# Patient Record
Sex: MALE | Race: OTHER | NOT HISPANIC OR LATINO
[De-identification: names, ages, dates, MRNs, and addresses within clinical notes are randomized per-mention and may not be internally consistent; named-entity substitution may affect disease eponyms.]

---

## 2019-07-30 PROBLEM — Z00.00 ENCOUNTER FOR PREVENTIVE HEALTH EXAMINATION: Status: ACTIVE | Noted: 2019-07-30

## 2019-08-05 ENCOUNTER — APPOINTMENT (OUTPATIENT)
Dept: OTOLARYNGOLOGY | Facility: CLINIC | Age: 54
End: 2019-08-05
Payer: MEDICARE

## 2019-08-05 VITALS
DIASTOLIC BLOOD PRESSURE: 73 MMHG | BODY MASS INDEX: 19.26 KG/M2 | WEIGHT: 130 LBS | HEIGHT: 69 IN | TEMPERATURE: 98.8 F | OXYGEN SATURATION: 99 % | HEART RATE: 51 BPM | SYSTOLIC BLOOD PRESSURE: 123 MMHG

## 2019-08-05 DIAGNOSIS — F17.200 NICOTINE DEPENDENCE, UNSPECIFIED, UNCOMPLICATED: ICD-10-CM

## 2019-08-05 DIAGNOSIS — Z78.9 OTHER SPECIFIED HEALTH STATUS: ICD-10-CM

## 2019-08-05 PROCEDURE — 92557 COMPREHENSIVE HEARING TEST: CPT

## 2019-08-05 PROCEDURE — 92588 EVOKED AUDITORY TST COMPLETE: CPT

## 2019-08-05 PROCEDURE — 99204 OFFICE O/P NEW MOD 45 MIN: CPT | Mod: 25

## 2019-08-05 PROCEDURE — 92550 TYMPANOMETRY & REFLEX THRESH: CPT

## 2019-08-05 PROCEDURE — 92504 EAR MICROSCOPY EXAMINATION: CPT

## 2019-08-05 NOTE — DATA REVIEWED
[de-identified] : today: pos OAEs and reflexes w/ nl tymps; diff w/ testing/poor reliability & varying responses based (mod-severe SNHL AU) on use of inserts vs. phones, RTC for ASSR recommended

## 2019-08-05 NOTE — ASSESSMENT
[FreeTextEntry1] : Discussed his audio results & recommended RTC for further testing; will consider imaging then. \par Discussed likely myoclonus & prevention w/ possible botox for ongoing sxs.

## 2019-08-05 NOTE — PHYSICAL EXAM
[Binocular Microscopic Exam] : Binocular microscopic exam was performed [Normal] : no rashes [de-identified] : large ITs bilat

## 2019-08-05 NOTE — HISTORY OF PRESENT ILLNESS
[de-identified] : Longstanding progressive bilateral hearing loss causing him to need to watch people's lips in conversation. Also w/ bilat HF nonpulsatile tinnitus. No hx ear surg, drainage, loud noise exp, or FHx hearing loss. \par 2 weeks of a very rapid beating sound (too fast to be a heartbeat) that lasts seconds to minutes. This causes some discomfort that resolves when the beating goes away. Hasn't tried meds.

## 2019-08-12 ENCOUNTER — APPOINTMENT (OUTPATIENT)
Dept: OTOLARYNGOLOGY | Facility: CLINIC | Age: 54
End: 2019-08-12
Payer: MEDICARE

## 2019-08-12 VITALS
OXYGEN SATURATION: 98 % | TEMPERATURE: 98 F | SYSTOLIC BLOOD PRESSURE: 115 MMHG | BODY MASS INDEX: 19.26 KG/M2 | WEIGHT: 130 LBS | HEART RATE: 74 BPM | HEIGHT: 69 IN | DIASTOLIC BLOOD PRESSURE: 73 MMHG

## 2019-08-12 DIAGNOSIS — G25.3 MYOCLONUS: ICD-10-CM

## 2019-08-12 PROCEDURE — 92585: CPT

## 2019-08-12 PROCEDURE — 99214 OFFICE O/P EST MOD 30 MIN: CPT

## 2019-08-12 NOTE — HISTORY OF PRESENT ILLNESS
[de-identified] : Longstanding progressive bilateral hearing loss causing him to need to watch people's lips in conversation; he now states that the R side seems more muffled than the left (for a few years). Also w/ bilat HF nonpulsatile tinnitus. No hx ear surg, drainage, pain, loud noise exp, or FHx hearing loss. No changes; returns for an ASSR d/t difficulty w/ the testing last time. \par The prior very rapid beating sound (too fast to be a heartbeat) that hasn't happened again since last seen.

## 2019-08-12 NOTE — DATA REVIEWED
[de-identified] : 8/19: pos OAEs and reflexes w/ nl tymps; diff w/ testing/poor reliability & varying responses based (mod-severe SNHL AU) on use of inserts vs. phones\par today: ASSR w/ L ess nl hearing; R c/w mod HL. ABR nl AS, abnl AD

## 2019-08-13 ENCOUNTER — FORM ENCOUNTER (OUTPATIENT)
Age: 54
End: 2019-08-13

## 2019-08-14 ENCOUNTER — OUTPATIENT (OUTPATIENT)
Dept: OUTPATIENT SERVICES | Facility: HOSPITAL | Age: 54
LOS: 1 days | End: 2019-08-14

## 2019-08-14 ENCOUNTER — APPOINTMENT (OUTPATIENT)
Dept: MRI IMAGING | Facility: CLINIC | Age: 54
End: 2019-08-14
Payer: MEDICARE

## 2019-08-14 PROCEDURE — 70553 MRI BRAIN STEM W/O & W/DYE: CPT | Mod: 26

## 2019-08-15 ENCOUNTER — APPOINTMENT (OUTPATIENT)
Dept: OTOLARYNGOLOGY | Facility: CLINIC | Age: 54
End: 2019-08-15

## 2019-08-19 ENCOUNTER — APPOINTMENT (OUTPATIENT)
Dept: OTOLARYNGOLOGY | Facility: CLINIC | Age: 54
End: 2019-08-19

## 2019-08-22 ENCOUNTER — APPOINTMENT (OUTPATIENT)
Dept: OTOLARYNGOLOGY | Facility: CLINIC | Age: 54
End: 2019-08-22
Payer: MEDICARE

## 2019-08-22 VITALS
WEIGHT: 130 LBS | TEMPERATURE: 97.9 F | HEART RATE: 54 BPM | OXYGEN SATURATION: 99 % | SYSTOLIC BLOOD PRESSURE: 123 MMHG | DIASTOLIC BLOOD PRESSURE: 67 MMHG | BODY MASS INDEX: 19.26 KG/M2 | HEIGHT: 69 IN

## 2019-08-22 DIAGNOSIS — H90.3 SENSORINEURAL HEARING LOSS, BILATERAL: ICD-10-CM

## 2019-08-22 DIAGNOSIS — H93.13 TINNITUS, BILATERAL: ICD-10-CM

## 2019-08-22 PROCEDURE — 99214 OFFICE O/P EST MOD 30 MIN: CPT

## 2019-08-22 NOTE — HISTORY OF PRESENT ILLNESS
[de-identified] : Longstanding progressive bilateral hearing loss causing him to need to watch people's lips in conversation; he now states that the R side seems more muffled than the left (for a few years). Also w/ bilat HF nonpulsatile tinnitus. No hx ear surg, drainage, pain, loud noise exp, or FHx hearing loss. No changes; returns to review a MRI after an abnl ABR. \par The prior very rapid beating sound (too fast to be a heartbeat) that hasn't happened again since last seen.

## 2019-08-22 NOTE — DATA REVIEWED
[de-identified] : 8/19: pos OAEs and reflexes w/ nl tymps; diff w/ testing/poor reliability & varying responses based (mod-severe SNHL AU) on use of inserts vs. phones\par 8/19: ASSR w/ L ess nl hearing; R c/w mod HL. ABR nl AS, abnl AD [de-identified] : MR brain/IAC: nl

## 2019-08-29 ENCOUNTER — APPOINTMENT (OUTPATIENT)
Dept: PHARMACY | Facility: CLINIC | Age: 54
End: 2019-08-29
Payer: MEDICARE

## 2019-08-29 PROCEDURE — V5010 ASSESSMENT FOR HEARING AID: CPT

## 2019-08-29 PROCEDURE — V5110: CPT

## 2019-08-29 PROCEDURE — V5261F: CUSTOM

## 2019-10-14 ENCOUNTER — APPOINTMENT (OUTPATIENT)
Dept: PHARMACY | Facility: CLINIC | Age: 54
End: 2019-10-14
Payer: MEDICARE

## 2019-10-14 PROCEDURE — V5299A: CUSTOM | Mod: NC

## 2019-10-28 ENCOUNTER — APPOINTMENT (OUTPATIENT)
Dept: PHARMACY | Facility: CLINIC | Age: 54
End: 2019-10-28

## 2020-01-02 ENCOUNTER — APPOINTMENT (OUTPATIENT)
Dept: PHARMACY | Facility: CLINIC | Age: 55
End: 2020-01-02
Payer: MEDICARE

## 2020-01-02 PROCEDURE — V5299A: CUSTOM | Mod: NC

## 2020-01-16 ENCOUNTER — EMERGENCY (EMERGENCY)
Facility: HOSPITAL | Age: 55
LOS: 1 days | Discharge: HOME | End: 2020-01-16
Attending: EMERGENCY MEDICINE | Admitting: EMERGENCY MEDICINE
Payer: MEDICARE

## 2020-01-16 ENCOUNTER — APPOINTMENT (OUTPATIENT)
Dept: PHARMACY | Facility: CLINIC | Age: 55
End: 2020-01-16
Payer: MEDICARE

## 2020-01-16 VITALS
TEMPERATURE: 98 F | OXYGEN SATURATION: 97 % | HEART RATE: 56 BPM | HEIGHT: 69 IN | RESPIRATION RATE: 16 BRPM | DIASTOLIC BLOOD PRESSURE: 81 MMHG | WEIGHT: 128.09 LBS | SYSTOLIC BLOOD PRESSURE: 117 MMHG

## 2020-01-16 PROCEDURE — 99283 EMERGENCY DEPT VISIT LOW MDM: CPT

## 2020-01-16 PROCEDURE — 92626 EVAL AUD FUNCJ 1ST HOUR: CPT

## 2020-01-16 PROCEDURE — V5110: CPT

## 2020-01-16 RX ORDER — ACETAMINOPHEN 500 MG
650 TABLET ORAL ONCE
Refills: 0 | Status: COMPLETED | OUTPATIENT
Start: 2020-01-16 | End: 2020-01-16

## 2020-01-16 RX ORDER — PSEUDOEPHEDRINE HCL 30 MG
30 TABLET ORAL ONCE
Refills: 0 | Status: COMPLETED | OUTPATIENT
Start: 2020-01-16 | End: 2020-01-16

## 2020-01-16 RX ORDER — ONDANSETRON 8 MG/1
4 TABLET, FILM COATED ORAL ONCE
Refills: 0 | Status: COMPLETED | OUTPATIENT
Start: 2020-01-16 | End: 2020-01-16

## 2020-01-16 NOTE — ED PROVIDER NOTE - ENMT, MLM
Airway patent, Bilateral clear nasal discharge. Mouth with normal mucosa. Throat has no vesicles, no tonsillar erythema, no oropharyngeal exudates and uvula is midline.

## 2020-01-16 NOTE — ED PROVIDER NOTE - PATIENT PORTAL LINK FT
You can access the FollowMyHealth Patient Portal offered by [facility] by registering at the following website: [email or web address]. By joining [facility]’s FollowMyHealth portal, you will also be able to view your health information using other applications (apps) compatible with our system.

## 2020-01-16 NOTE — ED PROVIDER NOTE - CLINICAL SUMMARY MEDICAL DECISION MAKING FREE TEXT BOX
Discussed with pt at length about viral vs bacterial. Pt adamant about Z-pack. Discussed that sx ate likely viral and recommended against abx use. Pt stating that he would like to go home and does not want any IV treatment. Discussed with pt at length about viral vs bacterial infections. Pt adamant about getting a  Z-pack. Discussed that sx are likely viral and recommended against abx use. Pt stating that he would like to go home and does not want any IV treatment. Refused testing for flu. labs, Xrays, etc. Will provide symptomatic tx

## 2020-01-16 NOTE — ED ADULT NURSE NOTE - NSIMPLEMENTINTERV_GEN_ALL_ED
Implemented All Universal Safety Interventions:  [place] to call system. Call bell, personal items and telephone within reach. Instruct patient to call for assistance. Room bathroom lighting operational. Non-slip footwear when patient is off stretcher. Physically safe environment: no spills, clutter or unnecessary equipment. Stretcher in lowest position, wheels locked, appropriate side rails in place.

## 2020-01-16 NOTE — ED PROVIDER NOTE - OBJECTIVE STATEMENT
54 y o male with no PMHX presents to ED c/o flu like sx that started x3 days ago.  Endorses general weakness, headache, ear pain, throat pain, and vomiting. States that his niece had the flu. Denies receiving a flu vaccine this year. No chest pain, abd pain, or SOB. Pt stating he would like to go home and does not want any IV treatment.

## 2020-01-22 DIAGNOSIS — B34.9 VIRAL INFECTION, UNSPECIFIED: ICD-10-CM

## 2020-01-22 DIAGNOSIS — R50.9 FEVER, UNSPECIFIED: ICD-10-CM

## 2020-01-22 DIAGNOSIS — H92.03 OTALGIA, BILATERAL: ICD-10-CM

## 2020-04-04 LAB
ALBUMIN SERPL ELPH-MCNC: 4.9 G/DL
ALP BLD-CCNC: 68 U/L
ALT SERPL-CCNC: 12 U/L
ANION GAP SERPL CALC-SCNC: 10 MMOL/L
AST SERPL-CCNC: 20 U/L
BASOPHILS # BLD AUTO: 0.04 K/UL
BASOPHILS NFR BLD AUTO: 0.6 %
BILIRUB SERPL-MCNC: 0.4 MG/DL
BUN SERPL-MCNC: 9 MG/DL
CALCIUM SERPL-MCNC: 10 MG/DL
CHLORIDE SERPL-SCNC: 107 MMOL/L
CO2 SERPL-SCNC: 29 MMOL/L
CREAT SERPL-MCNC: 0.86 MG/DL
CRP SERPL-MCNC: 0.14 MG/DL
EOSINOPHIL # BLD AUTO: 0.09 K/UL
EOSINOPHIL NFR BLD AUTO: 1.4 %
ERYTHROCYTE [SEDIMENTATION RATE] IN BLOOD BY WESTERGREN METHOD: 2 MM/HR
GLUCOSE SERPL-MCNC: 93 MG/DL
HCT VFR BLD CALC: 44.2 %
HGB BLD-MCNC: 14.8 G/DL
HIV1+2 AB SPEC QL IA.RAPID: NONREACTIVE
IMM GRANULOCYTES NFR BLD AUTO: 0.2 %
LYMPHOCYTES # BLD AUTO: 2.25 K/UL
LYMPHOCYTES NFR BLD AUTO: 36 %
MAN DIFF?: NORMAL
MCHC RBC-ENTMCNC: 30 PG
MCHC RBC-ENTMCNC: 33.5 GM/DL
MCV RBC AUTO: 89.5 FL
MONOCYTES # BLD AUTO: 0.58 K/UL
MONOCYTES NFR BLD AUTO: 9.3 %
NEUTROPHILS # BLD AUTO: 3.28 K/UL
NEUTROPHILS NFR BLD AUTO: 52.5 %
PLATELET # BLD AUTO: 190 K/UL
POTASSIUM SERPL-SCNC: 4.9 MMOL/L
PROT SERPL-MCNC: 7.4 G/DL
RBC # BLD: 4.94 M/UL
RBC # FLD: 16.1 %
SODIUM SERPL-SCNC: 146 MMOL/L
T PALLIDUM AB SER DONR QL IF: NONREACTIVE
TSH SERPL-ACNC: 2.58 UIU/ML
WBC # FLD AUTO: 6.25 K/UL